# Patient Record
Sex: FEMALE | Race: WHITE | NOT HISPANIC OR LATINO | Employment: OTHER | URBAN - METROPOLITAN AREA
[De-identification: names, ages, dates, MRNs, and addresses within clinical notes are randomized per-mention and may not be internally consistent; named-entity substitution may affect disease eponyms.]

---

## 2022-08-10 ENCOUNTER — TRANSFERRED RECORDS (OUTPATIENT)
Dept: HEALTH INFORMATION MANAGEMENT | Facility: CLINIC | Age: 72
End: 2022-08-10

## 2023-01-18 ENCOUNTER — TELEPHONE (OUTPATIENT)
Dept: OPHTHALMOLOGY | Facility: CLINIC | Age: 73
End: 2023-01-18
Payer: COMMERCIAL

## 2023-01-18 NOTE — TELEPHONE ENCOUNTER
Spoke with pt today after reviewing the notes that were sent from Dr. Zepeda's office. Pt is experiencing blurred vision and denies new flashes/floaters/curtain effect. She denies missing any portion of her vision and wondered if it was cataract activity. Dr. Zepeda had documented a cataract in the right eye, and we agree this is likely worsening and does not warrant an urgent/emergent appointment with our resident physician at the . Pt will try her regular ophthalmologist's office again to see Dr. Acosta and will call me back if she does notice symptoms that are concerning for a retinal issue.    AUNDREA Chun 10:05 AM January 18, 2023

## 2023-02-12 ENCOUNTER — HEALTH MAINTENANCE LETTER (OUTPATIENT)
Age: 73
End: 2023-02-12

## 2023-07-28 ENCOUNTER — TRANSFERRED RECORDS (OUTPATIENT)
Dept: HEALTH INFORMATION MANAGEMENT | Facility: CLINIC | Age: 73
End: 2023-07-28

## 2024-03-10 ENCOUNTER — HEALTH MAINTENANCE LETTER (OUTPATIENT)
Age: 74
End: 2024-03-10